# Patient Record
Sex: FEMALE | Race: WHITE | NOT HISPANIC OR LATINO | Employment: FULL TIME | URBAN - METROPOLITAN AREA
[De-identification: names, ages, dates, MRNs, and addresses within clinical notes are randomized per-mention and may not be internally consistent; named-entity substitution may affect disease eponyms.]

---

## 2020-05-11 DIAGNOSIS — Z30.41 ORAL CONTRACEPTIVE PILL SURVEILLANCE: Primary | ICD-10-CM

## 2020-05-11 RX ORDER — NORGESTIMATE AND ETHINYL ESTRADIOL 0.25-0.035
1 KIT ORAL DAILY
Qty: 28 TABLET | Refills: 1 | Status: SHIPPED | OUTPATIENT
Start: 2020-05-11 | End: 2021-05-20

## 2020-05-11 RX ORDER — NORGESTIMATE AND ETHINYL ESTRADIOL 0.25-0.035
1 KIT ORAL DAILY
COMMUNITY
End: 2020-05-11 | Stop reason: SDUPTHER

## 2020-06-11 ENCOUNTER — TELEPHONE (OUTPATIENT)
Dept: OBGYN CLINIC | Facility: CLINIC | Age: 20
End: 2020-06-11

## 2020-06-11 DIAGNOSIS — Z30.41 ORAL CONTRACEPTIVE PILL SURVEILLANCE: ICD-10-CM

## 2020-06-11 RX ORDER — NORGESTIMATE AND ETHINYL ESTRADIOL 0.25-0.035
1 KIT ORAL DAILY
Qty: 28 TABLET | Refills: 3 | Status: SHIPPED | OUTPATIENT
Start: 2020-06-11 | End: 2021-05-20

## 2020-08-18 ENCOUNTER — NURSE TRIAGE (OUTPATIENT)
Dept: OTHER | Facility: OTHER | Age: 20
End: 2020-08-18

## 2020-08-18 NOTE — TELEPHONE ENCOUNTER
Reason for Disposition   COVID-19 Testing, questions about    Answer Assessment - Initial Assessment Questions  1  COVID-19 DIAGNOSIS: "Who made your Coronavirus (COVID-19) diagnosis?" "Was it confirmed by a positive lab test?" If not diagnosed by a HCP, ask "Are there lots of cases (community spread) where you live?" (See public health department website, if unsure)      Based on s/s pt is suspiscious  2  ONSET: "When did the COVID-19 symptoms start?"      yesterday  3  WORST SYMPTOM: "What is your worst symptom?" (e g , cough, fever, shortness of breath, muscle aches)     Fever  4  COUGH: "Do you have a cough?" If so, ask: "How bad is the cough?"       denies  5  FEVER: "Do you have a fever?" If so, ask: "What is your temperature, how was it measured, and when did it start?"      100 9  6  RESPIRATORY STATUS: "Describe your breathing?" (e g , shortness of breath, wheezing, unable to speak)       no  7  BETTER-SAME-WORSE: "Are you getting better, staying the same or getting worse compared to yesterday?"  If getting worse, ask, "In what way?"      same    8   OTHER SYMPTOMS: "Do you have any other symptoms?"  (e g , chills, fatigue, headache, loss of smell or taste, muscle pain, sore throat)     Headache yesterday and runny nose    Protocols used: CORONAVIRUS (COVID-19) DIAGNOSED OR SUSPECTED-ADULT-[place]

## 2020-08-18 NOTE — TELEPHONE ENCOUNTER
Regarding: Covid-19  ----- Message from Audrey Saldana sent at 8/18/2020 11:53 AM EDT -----  "I woke up with a fever of 100 8, orally   It did drop, but I would like to get tested "

## 2020-08-18 NOTE — TELEPHONE ENCOUNTER
After triage complete pt notes she can go to [facility] for a rapid swab so she will go there instead

## 2021-05-20 ENCOUNTER — APPOINTMENT (EMERGENCY)
Dept: RADIOLOGY | Facility: HOSPITAL | Age: 21
End: 2021-05-20
Payer: COMMERCIAL

## 2021-05-20 ENCOUNTER — HOSPITAL ENCOUNTER (EMERGENCY)
Facility: HOSPITAL | Age: 21
Discharge: HOME | End: 2021-05-21
Payer: COMMERCIAL

## 2021-05-20 VITALS
SYSTOLIC BLOOD PRESSURE: 134 MMHG | DIASTOLIC BLOOD PRESSURE: 50 MMHG | HEART RATE: 98 BPM | TEMPERATURE: 98.1 F | HEIGHT: 64 IN | OXYGEN SATURATION: 100 % | RESPIRATION RATE: 18 BRPM | WEIGHT: 125 LBS | BODY MASS INDEX: 21.34 KG/M2

## 2021-05-20 DIAGNOSIS — S93.492A SPRAIN OF OTHER LIGAMENT OF LEFT ANKLE, INITIAL ENCOUNTER: Primary | ICD-10-CM

## 2021-05-20 DIAGNOSIS — S93.602A FOOT SPRAIN, LEFT, INITIAL ENCOUNTER: ICD-10-CM

## 2021-05-20 LAB
EXT PREG TEST URINE: NEGATIVE
EXT. CONTROL ED NAV: NORMAL

## 2021-05-20 PROCEDURE — 99283 EMERGENCY DEPT VISIT LOW MDM: CPT

## 2021-05-20 PROCEDURE — 73630 X-RAY EXAM OF FOOT: CPT

## 2021-05-20 PROCEDURE — 99284 EMERGENCY DEPT VISIT MOD MDM: CPT

## 2021-05-20 PROCEDURE — 81025 URINE PREGNANCY TEST: CPT

## 2021-05-20 PROCEDURE — 73610 X-RAY EXAM OF ANKLE: CPT

## 2021-05-20 RX ORDER — IBUPROFEN 600 MG/1
600 TABLET ORAL ONCE
Status: COMPLETED | OUTPATIENT
Start: 2021-05-20 | End: 2021-05-20

## 2021-05-20 RX ORDER — LORATADINE 10 MG/1
10 TABLET ORAL DAILY
COMMUNITY

## 2021-05-20 RX ORDER — NORGESTIMATE AND ETHINYL ESTRADIOL 0.25-0.035
1 KIT ORAL DAILY
COMMUNITY
Start: 2021-03-25

## 2021-05-20 RX ADMIN — IBUPROFEN 600 MG: 600 TABLET ORAL at 23:11

## 2021-05-21 NOTE — ED NOTES
AVS reviewed and questions answered, pt verbalized understanding  Pt provided with cam boot for left ankle  Pt denied crutches at this time as she has some at home per her report  Pt ambulated with steady gait at time of d/c        Abdoul Camarillo RN  05/21/21 0001

## 2021-05-21 NOTE — ED PROVIDER NOTES
History  Chief Complaint   Patient presents with    Ankle Injury     pt taking kickboxing and kicked plastic part of equipment, now presents with left ankle pain  pt was able to walk in on her own, took 2 tylenol pta  mild edmema, +csm     29-year-old female no significant past medical history DM kick boxing class tonight when she kicked tart against a plastic object and felt some pain in her foot  Patient complaining of pain in the foot and ankle region on lateral aspect  Patient having some pain with walking  Patient denies any knee pain or hip pain  Patient has no other          Prior to Admission Medications   Prescriptions Last Dose Informant Patient Reported? Taking?   loratadine (CLARITIN) 10 mg tablet   Yes No   Sig: Take 10 mg by mouth daily   norgestimate-ethinyl estradiol (Sprintec 28) 0 25-35 MG-MCG per tablet   Yes Yes   Sig: Take 1 tablet by mouth daily      Facility-Administered Medications: None       Past Medical History:   Diagnosis Date    Asthma        History reviewed  No pertinent surgical history  Family History   Problem Relation Age of Onset    EHSAN disease Mother     Diabetes Father     Lung cancer Maternal Grandfather     Hodgkin's lymphoma Cousin      I have reviewed and agree with the history as documented  E-Cigarette/Vaping    E-Cigarette Use Never User      E-Cigarette/Vaping Substances    Nicotine No     THC No     CBD No     Flavoring No     Other No     Unknown No      Social History     Tobacco Use    Smoking status: Never Smoker    Smokeless tobacco: Never Used   Substance Use Topics    Alcohol use: Yes     Frequency: 2-4 times a month     Drinks per session: 1 or 2     Binge frequency: Never    Drug use: Never       Review of Systems   Musculoskeletal: Positive for arthralgias and gait problem  All other systems reviewed and are negative  Physical Exam  Physical Exam  Vitals signs reviewed     Musculoskeletal:      Comments: Left ankle demonstrates no deformity some tenderness palpation proximal lateral aspect of the foot  No wound noted         Vital Signs  ED Triage Vitals [05/20/21 2238]   Temperature Pulse Respirations Blood Pressure SpO2   98 1 °F (36 7 °C) 98 18 134/50 100 %      Temp Source Heart Rate Source Patient Position - Orthostatic VS BP Location FiO2 (%)   Oral Monitor -- -- --      Pain Score       5           Vitals:    05/20/21 2238   BP: 134/50   Pulse: 98         Visual Acuity      ED Medications  Medications   ibuprofen (MOTRIN) tablet 600 mg (600 mg Oral Given 5/20/21 2311)       Diagnostic Studies  Results Reviewed     Procedure Component Value Units Date/Time    POCT pregnancy, urine [[id number]]  (Normal) Resulted: 05/20/21 2310    Lab Status: Final result Updated: 05/20/21 2310     EXT PREG TEST UR (Ref: Negative) Negative     Control Valid                 XR foot 3+ vw left   ED Interpretation by Tierra Parham MD (05/20 2329)   No fracture no dislocation  by Dick Bach (05/20 2318)      XR ankle 3+ views LEFT    (Results Pending)              Procedures  Procedures         ED Course                             SBIRT 20yo+      Most Recent Value   SBIRT (25 yo +)   In order to provide better care to our patients, we are screening all of our patients for alcohol and drug use  Would it be okay to ask you these screening questions? Yes Filed at: 05/20/2021 2247   Initial Alcohol Screen: US AUDIT-C    1  How often do you have a drink containing alcohol? 1 Filed at: 05/20/2021 2247   2  How many drinks containing alcohol do you have on a typical day you are drinking? 0 Filed at: 05/20/2021 2247   3a  Male UNDER 65: How often do you have five or more drinks on one occasion? 0 Filed at: 05/20/2021 2247   3b  FEMALE Any Age, or MALE 65+: How often do you have 4 or more drinks on one occassion?   0 Filed at: 05/20/2021 2247   Audit-C Score  1 Filed at: 05/20/2021 2247   VIRAL: How many times in the past year have you    Used an illegal drug or used a prescription medication for non-medical reasons? Never Filed at: 05/20/2021 2247                    [facility]  Number of Diagnoses or Management Options  Diagnosis management comments: Patient had x-ray of left ankle left foot demonstrate no acute fracture  Plan will be to place patient in cam walker crutches mandatory follow-up with ortho on call secondary to location of her pain being over the left 5th metatarsal region concern for possible Shaikh fracture      Disposition  Final diagnoses:   Sprain of other ligament of left ankle, initial encounter   Foot sprain, left, initial encounter     Time reflects when diagnosis was documented in both MDM as applicable and the Disposition within this note     Time User Action Codes Description Comment    5/20/2021 11:31 PM Larissa Echavarria [V48 [id number]] Sprain of other ligament of left ankle, initial encounter     5/20/2021 11:31 PM Larissa Echavarria [S93 [id number]] Foot sprain, left, initial encounter       ED Disposition     ED Disposition Condition Date/Time Comment    Discharge Stable Thu May 20, 2021 11:31 PM Soto Hernández discharge to home/self care  Follow-up Information     Follow up With Specialties Details Why Contact Info    Chester Colbert MD Orthopedic Surgery Schedule an appointment as soon as possible for a visit in 3 days  [address] [facility] [address] [phone]            Patient's Medications   Discharge Prescriptions    No medications on file     No discharge procedures on file      PDMP Review     None          ED Provider  Electronically Signed by           Lion Pastrana MD  05/20/21 3371

## 2021-05-24 ENCOUNTER — OFFICE VISIT (OUTPATIENT)
Dept: OBGYN CLINIC | Facility: CLINIC | Age: 21
End: 2021-05-24
Payer: COMMERCIAL

## 2021-05-24 ENCOUNTER — APPOINTMENT (OUTPATIENT)
Dept: RADIOLOGY | Facility: CLINIC | Age: 21
End: 2021-05-24
Payer: COMMERCIAL

## 2021-05-24 VITALS
BODY MASS INDEX: 21.34 KG/M2 | WEIGHT: 125 LBS | HEART RATE: 71 BPM | DIASTOLIC BLOOD PRESSURE: 83 MMHG | SYSTOLIC BLOOD PRESSURE: 130 MMHG | HEIGHT: 64 IN

## 2021-05-24 DIAGNOSIS — S90.30XA CONTUSION OF DORSUM OF FOOT: ICD-10-CM

## 2021-05-24 DIAGNOSIS — M79.672 PAIN IN LEFT FOOT: ICD-10-CM

## 2021-05-24 DIAGNOSIS — Z01.89 ENCOUNTER FOR LOWER EXTREMITY COMPARISON IMAGING STUDY: ICD-10-CM

## 2021-05-24 DIAGNOSIS — M79.672 PAIN IN LEFT FOOT: Primary | ICD-10-CM

## 2021-05-24 PROCEDURE — 73620 X-RAY EXAM OF FOOT: CPT

## 2021-05-24 PROCEDURE — 99203 OFFICE O/P NEW LOW 30 MIN: CPT | Performed by: ORTHOPAEDIC SURGERY

## 2021-05-24 NOTE — PROGRESS NOTES
Assessment/Plan:  1  Pain in left foot  XR foot 2 vw left   2  Contusion of dorsum of foot         Scribe Attestation    I,:  Cristelaajit Jaimes Call am acting as a scribe while in the presence of the attending physician :       I,:  Maribell Escoto MD personally performed the services described in this documentation    as scribed in my presence :             Upon review of the x-rays from the ED and today's x-rays I see no evidence of fracture  The ankle and foot is stable to examination  She is tender over the 3rd and 4th metatarsals  I feel she has suffered a contusion to her foot  She is very fortunate that there is not a fracture  She can now wean herself out of the boot and return to normal activities on a gradual basis  I see no reason for her to delay her state [employer] training  Subjective:   Eloisa Sheikh is a 23 y o  female who presents to the office today for evaluation of her left foot and ankle  She states this past Thursday she was a kickboxing class and attempted to kick a heavy bag  She missed a heavy bag and kicked the plastic base of another heavy bag  She developed immediate sharp severe pain about the left foot and ankle  She went to the emergency department on Sunday due to persistent pain and swelling where she received x-rays which were negative for fracture  She was diagnosed with a foot sprain and referred to see us  Isidoro Chandler states that her initial pain has improved but continues to have the mild ache about the lateral aspect of the left ankle and foot  Bearing weight on the left lower extremity will exacerbate her symptoms  She denies radiating pain or distal paresthesias  She is better in her walking boot and at rest   She will ice the ankle to manage pain and swelling as well  She is scheduled to begin the [employer] at the end of June and would like to see that all is well in regards to the foot and ankle        Review of Systems   Constitutional: Negative for chills, fever and unexpected weight change  HENT: Negative for hearing loss, nosebleeds and sore throat  Eyes: Negative for pain, redness and visual disturbance  Respiratory: Negative for cough, shortness of breath and wheezing  Cardiovascular: Negative for chest pain, palpitations and leg swelling  Gastrointestinal: Negative for abdominal pain, nausea and vomiting  Endocrine: Negative for polydipsia and polyuria  Genitourinary: Negative for dysuria and hematuria  Musculoskeletal:        See HPI   Skin: Negative for rash and wound  Neurological: Negative for dizziness, numbness and headaches  Psychiatric/Behavioral: Negative for decreased concentration and suicidal ideas  The patient is not nervous/anxious  Past Medical History:   Diagnosis Date    Asthma        History reviewed  No pertinent surgical history      Family History   Problem Relation Age of Onset    EHSAN disease Mother     Diabetes Father     Lung cancer Maternal Grandfather     Hodgkin's lymphoma Cousin        Social History     Occupational History    Occupation:    [employer]    Occupation: Student       Comment: part- time   Tobacco Use    Smoking status: Never Smoker    Smokeless tobacco: Never Used   Substance and Sexual Activity    Alcohol use: Yes     Frequency: 2-4 times a month     Drinks per session: 1 or 2     Binge frequency: Never    Drug use: Never    Sexual activity: Yes     Partners: Male     Birth control/protection: None         Current Outpatient Medications:     loratadine (CLARITIN) 10 mg tablet, Take 10 mg by mouth daily, Disp: , Rfl:     norgestimate-ethinyl estradiol (Sprintec 28) 0 25-35 MG-MCG per tablet, Take 1 tablet by mouth daily, Disp: , Rfl:     No Known Allergies    Objective:  Vitals:    05/24/21 0947   BP: 130/83   Pulse: 71       Left Ankle Exam     Tenderness   The patient is experiencing tenderness in the ATF (Third 3rd tarsometatarsal joint, calcaneal fibular ligament)  Swelling: mild    Range of Motion   Dorsiflexion: normal   Plantar flexion: normal   Eversion: normal   Inversion: normal     Muscle Strength   Dorsiflexion:  5/5   Plantar flexion:  5/5   Anterior tibial:  5/5   Posterior tibial:  5/5  Gastrocsoleus:  5/5  Peroneal muscle:  5/5    Tests   Anterior drawer: negative  Varus tilt: negative    Other   Sensation: normal  Pulse: present (2+ DP)    Comments:  No evidence of bruising          Strength/Myotome Testing     Left Ankle/Foot   Dorsiflexion: 5  Plantar flexion: 5      Physical Exam  Vitals signs reviewed  Constitutional:       Appearance: She is well-developed  HENT:      Head: Normocephalic and atraumatic  Eyes:      General:         Right eye: No discharge  Left eye: No discharge  Conjunctiva/sclera: Conjunctivae normal    Neck:      Musculoskeletal: Normal range of motion and neck supple  Cardiovascular:      Rate and Rhythm: Regular rhythm  Pulmonary:      Effort: Pulmonary effort is normal  No respiratory distress  Breath sounds: No stridor  Skin:     General: Skin is warm and dry  Neurological:      Mental Status: She is alert and oriented to person, place, and time  Psychiatric:         Behavior: Behavior normal          I have personally reviewed pertinent films in PACS and my interpretation is as follows:  X-rays of the left foot and ankle taken on May 20th 2020 were reviewed and found to be normal   There is no evidence of acute fracture or other osseous abnormality  Weight-bearing x-rays taken today were reviewed and found no evidence of acute fracture, Lisfranc injury or other osseous abnormality

## 2021-05-24 NOTE — LETTER
May 24, 2021     Patient: Maira Soto   YOB: 2000   Date of Visit: 5/24/2021       To Whom it May Concern:    Chela Garcia is under my professional care  She was seen in my office on 5/24/2021  She may return to work on 05/25/2021  If you have any questions or concerns, please don't hesitate to call  Sincerely,          Juana Storey MD        CC: Sydni Call